# Patient Record
Sex: MALE | Race: WHITE | Employment: STUDENT | URBAN - METROPOLITAN AREA
[De-identification: names, ages, dates, MRNs, and addresses within clinical notes are randomized per-mention and may not be internally consistent; named-entity substitution may affect disease eponyms.]

---
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
## 2021-05-28 ENCOUNTER — TRANSCRIBE ORDER (OUTPATIENT)
Dept: SCHEDULING | Age: 21
End: 2021-05-28

## 2021-05-28 DIAGNOSIS — M84.352S STRESS FRACTURE OF LEFT FEMUR, SEQUELA: Primary | ICD-10-CM

## 2021-06-03 ENCOUNTER — HOSPITAL ENCOUNTER (OUTPATIENT)
Dept: MRI IMAGING | Age: 21
Discharge: HOME | End: 2021-06-03
Attending: ORTHOPAEDIC SURGERY
Payer: COMMERCIAL

## 2021-06-03 DIAGNOSIS — M84.352S STRESS FRACTURE OF LEFT FEMUR, SEQUELA: ICD-10-CM

## 2021-06-03 PROCEDURE — 73718 MRI LOWER EXTREMITY W/O DYE: CPT
